# Patient Record
Sex: FEMALE | Race: WHITE | NOT HISPANIC OR LATINO
[De-identification: names, ages, dates, MRNs, and addresses within clinical notes are randomized per-mention and may not be internally consistent; named-entity substitution may affect disease eponyms.]

---

## 2017-09-18 ENCOUNTER — TRANSCRIPTION ENCOUNTER (OUTPATIENT)
Age: 57
End: 2017-09-18

## 2018-08-26 ENCOUNTER — TRANSCRIPTION ENCOUNTER (OUTPATIENT)
Age: 58
End: 2018-08-26

## 2021-07-17 PROBLEM — Z00.00 ENCOUNTER FOR PREVENTIVE HEALTH EXAMINATION: Status: ACTIVE | Noted: 2021-07-17

## 2021-07-22 ENCOUNTER — APPOINTMENT (OUTPATIENT)
Dept: OBGYN | Facility: CLINIC | Age: 61
End: 2021-07-22
Payer: COMMERCIAL

## 2021-07-22 VITALS
HEIGHT: 68.4 IN | SYSTOLIC BLOOD PRESSURE: 126 MMHG | RESPIRATION RATE: 16 BRPM | BODY MASS INDEX: 29.21 KG/M2 | HEART RATE: 80 BPM | DIASTOLIC BLOOD PRESSURE: 78 MMHG | WEIGHT: 195 LBS

## 2021-07-22 DIAGNOSIS — Z80.41 FAMILY HISTORY OF MALIGNANT NEOPLASM OF OVARY: ICD-10-CM

## 2021-07-22 PROCEDURE — 99213 OFFICE O/P EST LOW 20 MIN: CPT | Mod: 25

## 2021-07-22 PROCEDURE — 99386 PREV VISIT NEW AGE 40-64: CPT

## 2021-07-22 RX ORDER — VALACYCLOVIR 1 G/1
1 TABLET, FILM COATED ORAL
Qty: 30 | Refills: 0 | Status: ACTIVE | COMMUNITY
Start: 2021-07-22 | End: 1900-01-01

## 2021-07-27 ENCOUNTER — NON-APPOINTMENT (OUTPATIENT)
Age: 61
End: 2021-07-27

## 2021-07-27 LAB — HPV HIGH+LOW RISK DNA PNL CVX: NOT DETECTED

## 2021-07-28 LAB — CYTOLOGY CVX/VAG DOC THIN PREP: NORMAL

## 2021-08-03 NOTE — HISTORY OF PRESENT ILLNESS
[Patient reported mammogram was normal] : Patient reported mammogram was normal [Patient declined breast sonogram] : Patient declined breast sonogram [postmenopausal] : postmenopausal [N] : Patient does not use contraception [Y] : Positive pregnancy history [Yes] : yes [Menarche Age: ____] : age at menarche was [unfilled] [Menopause Age: ____] : age at menopause was [unfilled] [Currently Active] : currently active [Men] : men [No] : No [TextBox_4] : VAGINAL DRYNESS AND DISCOMFORT [Mammogramdate] : July 2020 [TextBox_19] : As per patient [TextBox_25] : As per patient never done [PapSmeardate] : July 2020 [BoneDensityDate] : 3 yrs ago [TextBox_37] : As per patient [ColonoscopyDate] : 5 yrs ago  [TextBox_43] : As per patient  [PGHxTotal] : 4 [PGxPremature] : 1 [PGHxAbortions] : 1 [FreeTextEntry1] : 2 miscarriage [TextBox_18] : currently on estradiol .01%cream  daily and estradiol patch .05% Bi weekly.

## 2021-08-03 NOTE — REVIEW OF SYSTEMS
[Negative] : Heme/Lymph [Patient Intake Form Reviewed] : Patient intake form was reviewed [FreeTextEntry8] : SEE HPI

## 2021-08-03 NOTE — PHYSICAL EXAM
[Alert] : alert [Appropriately responsive] : appropriately responsive [No Acute Distress] : no acute distress [No Lymphadenopathy] : no lymphadenopathy [Regular Rate Rhythm] : regular rate rhythm [Clear to Auscultation B/L] : clear to auscultation bilaterally [Soft] : soft [Non-tender] : non-tender [Non-distended] : non-distended [No Mass] : no mass [Oriented x3] : oriented x3 [Examination Of The Breasts] : a normal appearance [No Masses] : no breast masses were palpable [Labia Majora] : normal [Labia Minora] : normal [Atrophy] : atrophy [Normal] : normal [Uterine Adnexae] : normal [No Murmurs] : no murmurs [No HSM] : No HSM [No Lesions] : no lesions [FreeTextEntry8] : R/V CONF, NO GROSS MASSES NOTED

## 2021-08-03 NOTE — DISCUSSION/SUMMARY
[FreeTextEntry1] : ANNUAL PREVENTITIVE CARE GYN EXAM\par ATROPHY\par MENOPAUSAL SYNDROME\par REFILL OF ESTROGEN VAGINAL CREAM\par REFILL OF ESTRADIOL PATCH\par H/O GENITAL HERPES\par REFILL OF VALCYCLOVIR\par S/E D/W PT\par MLT DW\par VAGINAL PAP\par SBE\par MAMMO\par DEXA RX WITH PCP\par COLONOSCOPY

## 2021-09-30 ENCOUNTER — APPOINTMENT (OUTPATIENT)
Dept: OBGYN | Facility: CLINIC | Age: 61
End: 2021-09-30
Payer: SELF-PAY

## 2021-09-30 DIAGNOSIS — N95.1 MENOPAUSAL AND FEMALE CLIMACTERIC STATES: ICD-10-CM

## 2021-09-30 PROCEDURE — 58999 UNLISTED PX FML GENITAL SYS: CPT | Mod: GC

## 2021-11-11 ENCOUNTER — APPOINTMENT (OUTPATIENT)
Dept: OBGYN | Facility: CLINIC | Age: 61
End: 2021-11-11
Payer: SELF-PAY

## 2021-11-11 PROCEDURE — 58999 UNLISTED PX FML GENITAL SYS: CPT

## 2021-11-11 RX ORDER — ESTRADIOL 0.1 MG/G
0.1 CREAM VAGINAL
Qty: 1 | Refills: 0 | Status: COMPLETED | COMMUNITY
Start: 2021-07-22 | End: 2021-11-11

## 2021-12-20 ENCOUNTER — APPOINTMENT (OUTPATIENT)
Dept: OBGYN | Facility: CLINIC | Age: 61
End: 2021-12-20
Payer: SELF-PAY

## 2021-12-20 PROCEDURE — ZZZZZ: CPT

## 2022-05-16 ENCOUNTER — APPOINTMENT (OUTPATIENT)
Dept: OBGYN | Facility: CLINIC | Age: 62
End: 2022-05-16
Payer: COMMERCIAL

## 2022-05-16 DIAGNOSIS — B37.3 CANDIDIASIS OF VULVA AND VAGINA: ICD-10-CM

## 2022-05-16 DIAGNOSIS — A60.00 HERPESVIRAL INFECTION OF UROGENITAL SYSTEM, UNSPECIFIED: ICD-10-CM

## 2022-05-16 PROCEDURE — 99213 OFFICE O/P EST LOW 20 MIN: CPT

## 2022-05-16 RX ORDER — VALACYCLOVIR 500 MG/1
500 TABLET, FILM COATED ORAL TWICE DAILY
Qty: 30 | Refills: 1 | Status: ACTIVE | COMMUNITY
Start: 2022-05-16 | End: 1900-01-01

## 2022-05-16 RX ORDER — FLUCONAZOLE 150 MG/1
150 TABLET ORAL
Qty: 3 | Refills: 0 | Status: ACTIVE | COMMUNITY
Start: 2022-05-16 | End: 1900-01-01

## 2022-05-16 NOTE — DISCUSSION/SUMMARY
[FreeTextEntry1] : Vaginal infection \par presumptive monilia\par vag cx \par Recurrent outbreak of genital herpes\par rx valtrex\par pt counselled that MLT can not be performed today\par pt told to reschedule in 4 weeks if she does not have any vaginal symptoms

## 2022-05-16 NOTE — HISTORY OF PRESENT ILLNESS
[FreeTextEntry1] : pt felt well s/p MLT treatment up to 4 months after 3rd treatment\par for past 2 months felt that she has a yeast infection \par also patient with kown genital herpes and felt irritation like an outbreak at same spot x 2 days\par pt received BLT cream today and felt discomfort in the area

## 2022-05-16 NOTE — REASON FOR VISIT
[Follow-Up] : a follow-up evaluation of [FreeTextEntry2] : pt presents for annual MLT but has noticed for a few weeks feeling of a yeast infection and 2 days irritation at vaginal opening

## 2022-05-16 NOTE — PHYSICAL EXAM
[Discharge] : a  ~M vaginal discharge was present [Moderate] : moderate [White] : white [Thick] : thick [Normal] : normal

## 2022-06-16 ENCOUNTER — APPOINTMENT (OUTPATIENT)
Dept: OBGYN | Facility: CLINIC | Age: 62
End: 2022-06-16
Payer: COMMERCIAL

## 2022-06-16 VITALS
HEIGHT: 68 IN | HEART RATE: 73 BPM | SYSTOLIC BLOOD PRESSURE: 130 MMHG | BODY MASS INDEX: 29.55 KG/M2 | WEIGHT: 195 LBS | DIASTOLIC BLOOD PRESSURE: 83 MMHG

## 2022-06-16 DIAGNOSIS — N89.8 OTHER SPECIFIED NONINFLAMMATORY DISORDERS OF VAGINA: ICD-10-CM

## 2022-06-16 PROCEDURE — 99213 OFFICE O/P EST LOW 20 MIN: CPT

## 2022-06-18 PROBLEM — N89.8 VAGINAL IRRITATION: Status: ACTIVE | Noted: 2022-06-18

## 2022-06-18 NOTE — DISCUSSION/SUMMARY
[FreeTextEntry1] : vulvovaginal atrophy\par vulvar irritation\par pt counselled not to proceed with MLT today\par and reschedule in 3-4 weeks\par

## 2022-06-18 NOTE — HISTORY OF PRESENT ILLNESS
[FreeTextEntry1] : pt with known h/o genital herpes\par last visit she had an active outbreak\par  now resolved\par pt still does not feel 100% externally \par internally feels ok

## 2022-06-18 NOTE — PHYSICAL EXAM
[Chaperone Present] : A chaperone was present in the examining room during all aspects of the physical examination [Appropriately responsive] : appropriately responsive [Alert] : alert [No Acute Distress] : no acute distress [Soft] : soft [Non-tender] : non-tender [Non-distended] : non-distended [No Mass] : no mass [Oriented x3] : oriented x3 [Vulvar Atrophy] : vulvar atrophy [Labia Majora] : normal [Labia Minora] : normal [Atrophy] : atrophy [Normal] : normal [Uterine Adnexae] : normal

## 2022-06-30 ENCOUNTER — TRANSCRIPTION ENCOUNTER (OUTPATIENT)
Age: 62
End: 2022-06-30

## 2022-07-12 ENCOUNTER — APPOINTMENT (OUTPATIENT)
Dept: OBGYN | Facility: CLINIC | Age: 62
End: 2022-07-12

## 2022-07-12 DIAGNOSIS — Z12.39 ENCOUNTER FOR OTHER SCREENING FOR MALIGNANT NEOPLASM OF BREAST: ICD-10-CM

## 2022-07-14 ENCOUNTER — APPOINTMENT (OUTPATIENT)
Dept: OBGYN | Facility: CLINIC | Age: 62
End: 2022-07-14

## 2022-07-14 DIAGNOSIS — N95.2 POSTMENOPAUSAL ATROPHIC VAGINITIS: ICD-10-CM

## 2022-07-14 PROCEDURE — 58999 UNLISTED PX FML GENITAL SYS: CPT

## 2022-07-17 PROBLEM — N95.2 VAGINAL ATROPHY: Status: ACTIVE | Noted: 2022-07-17

## 2022-07-17 PROBLEM — N95.2 VAGINAL ATROPHY: Status: RESOLVED | Noted: 2022-07-12 | Resolved: 2022-07-17

## 2022-07-17 NOTE — PLAN
[FreeTextEntry1] : Vaginal atrophy\par pt doing well with MLT treatment\par presents for annual maintenance\par MLT performed\par pt also has vaginal atrophy symptoms at the introitus- rx given on 7/12/22- for estrace cream to apply qhs x 2 weeks and then BIW

## 2022-07-25 ENCOUNTER — APPOINTMENT (OUTPATIENT)
Dept: OBGYN | Facility: CLINIC | Age: 62
End: 2022-07-25

## 2022-08-23 ENCOUNTER — APPOINTMENT (OUTPATIENT)
Dept: OBGYN | Facility: CLINIC | Age: 62
End: 2022-08-23

## 2022-08-23 VITALS
OXYGEN SATURATION: 98 % | WEIGHT: 210 LBS | SYSTOLIC BLOOD PRESSURE: 145 MMHG | HEIGHT: 68 IN | HEART RATE: 75 BPM | BODY MASS INDEX: 31.83 KG/M2 | RESPIRATION RATE: 17 BRPM | DIASTOLIC BLOOD PRESSURE: 86 MMHG

## 2022-08-23 DIAGNOSIS — Z01.419 ENCOUNTER FOR GYNECOLOGICAL EXAMINATION (GENERAL) (ROUTINE) W/OUT ABNORMAL FINDINGS: ICD-10-CM

## 2022-08-23 PROCEDURE — 99396 PREV VISIT EST AGE 40-64: CPT

## 2022-08-23 NOTE — PHYSICAL EXAM
[Chaperone Present] : A chaperone was present in the examining room during all aspects of the physical examination [Appropriately responsive] : appropriately responsive [Alert] : alert [No Acute Distress] : no acute distress [No Lymphadenopathy] : no lymphadenopathy [Regular Rate Rhythm] : regular rate rhythm [No Murmurs] : no murmurs [Clear to Auscultation B/L] : clear to auscultation bilaterally [Soft] : soft [Non-tender] : non-tender [Non-distended] : non-distended [No HSM] : No HSM [No Lesions] : no lesions [No Mass] : no mass [Oriented x3] : oriented x3 [Examination Of The Breasts] : a normal appearance [No Masses] : no breast masses were palpable [Labia Majora] : normal [Labia Minora] : normal [Normal] : normal [Absent] : absent [Uterine Adnexae] : absent [FreeTextEntry6] : pelvic exam unremarkable [FreeTextEntry8] : R/V CONF, NO GROSS MASSES NOTED

## 2022-08-23 NOTE — HISTORY OF PRESENT ILLNESS
[postmenopausal] : postmenopausal [Y] : Positive pregnancy history [No] : Patient does not have concerns regarding sex [FreeTextEntry1] : annual wwe [Mammogramdate] : 2022 [PapSmeardate] : 2021 [ColonoscopyDate] : 2015 [PGHxTotal] : 3 [[facility]] : 1 [[employer] [facility] Cliniciving] : 1 [PGHxABSpont] : 2

## 2022-08-24 LAB — HPV HIGH+LOW RISK DNA PNL CVX: NOT DETECTED

## 2022-08-29 LAB — CYTOLOGY CVX/VAG DOC THIN PREP: NORMAL

## 2022-12-15 RX ORDER — ESTRADIOL 0.1 MG/G
0.1 CREAM VAGINAL
Qty: 1 | Refills: 1 | Status: ACTIVE | COMMUNITY
Start: 2022-07-12 | End: 1900-01-01

## 2022-12-22 ENCOUNTER — NON-APPOINTMENT (OUTPATIENT)
Age: 62
End: 2022-12-22

## 2023-03-16 ENCOUNTER — OFFICE (OUTPATIENT)
Dept: URBAN - METROPOLITAN AREA CLINIC 100 | Facility: CLINIC | Age: 63
Setting detail: OPHTHALMOLOGY
End: 2023-03-16
Payer: COMMERCIAL

## 2023-03-16 DIAGNOSIS — H02.834: ICD-10-CM

## 2023-03-16 DIAGNOSIS — H16.223: ICD-10-CM

## 2023-03-16 DIAGNOSIS — H52.7: ICD-10-CM

## 2023-03-16 DIAGNOSIS — H25.13: ICD-10-CM

## 2023-03-16 DIAGNOSIS — H40.013: ICD-10-CM

## 2023-03-16 DIAGNOSIS — H02.831: ICD-10-CM

## 2023-03-16 PROCEDURE — 92015 DETERMINE REFRACTIVE STATE: CPT | Performed by: OPHTHALMOLOGY

## 2023-03-16 PROCEDURE — 92014 COMPRE OPH EXAM EST PT 1/>: CPT | Performed by: OPHTHALMOLOGY

## 2023-03-16 PROCEDURE — 92133 CPTRZD OPH DX IMG PST SGM ON: CPT | Performed by: OPHTHALMOLOGY

## 2023-03-16 ASSESSMENT — REFRACTION_MANIFEST
OS_ADD: +2.50
OS_VA1: 20/20-1
OD_VA1: 20/20
OS_SPHERE: +0.75
OS_CYLINDER: -0.75
OS_VA1: 20/20-1
OD_AXIS: 110
OD_CYLINDER: -1.25
OS_CYLINDER: -0.75
OD_SPHERE: +1.50
OD_VA1: 20/20
OD_ADD: +2.50
OD_AXIS: 110
OS_AXIS: 070
OD_SPHERE: +1.50
OS_SPHERE: +0.75
OD_CYLINDER: -1.25
OS_AXIS: 070

## 2023-03-16 ASSESSMENT — AXIALLENGTH_DERIVED
OS_AL: 23.5378
OS_AL: 23.5378
OD_AL: 23.4357
OS_AL: 23.5378

## 2023-03-16 ASSESSMENT — REFRACTION_CURRENTRX
OS_ADD: +2.25
OD_ADD: +2.25
OS_CYLINDER: -0.50
OS_SPHERE: +1.50
OD_AXIS: 108
OS_CYLINDER: -0.50
OD_VPRISM_DIRECTION: PROGS
OD_VPRISM_DIRECTION: SV
OD_CYLINDER: -1.00
OD_OVR_VA: 20/
OD_CYLINDER: -0.75
OS_SPHERE: PLANO
OD_AXIS: 115
OS_VPRISM_DIRECTION: SV
OS_VPRISM_DIRECTION: PROGS
OS_AXIS: 080
OS_AXIS: 099
OD_SPHERE: +1.00
OS_OVR_VA: 20/
OD_SPHERE: +2.50
OD_OVR_VA: 20/
OS_OVR_VA: 20/

## 2023-03-16 ASSESSMENT — PACHYMETRY
OD_CT_CORRECTION: -1
OD_CT_UM: 555
OS_CT_UM: 539
OS_CT_CORRECTION: 1

## 2023-03-16 ASSESSMENT — REFRACTION_AUTOREFRACTION
OS_CYLINDER: -0.75
OD_CYLINDER: -1.25
OS_SPHERE: +0.75
OD_AXIS: 109
OS_AXIS: 071
OD_SPHERE: +1.50

## 2023-03-16 ASSESSMENT — CONFRONTATIONAL VISUAL FIELD TEST (CVF)
OS_FINDINGS: FULL
OD_FINDINGS: FULL

## 2023-03-16 ASSESSMENT — VISUAL ACUITY
OS_BCVA: 20/20-2
OD_BCVA: 20/20-1

## 2023-03-16 ASSESSMENT — SPHEQUIV_DERIVED
OD_SPHEQUIV: 0.875
OS_SPHEQUIV: 0.375

## 2023-03-16 ASSESSMENT — KERATOMETRY
OS_K2POWER_DIOPTERS: 43.75
OD_AXISANGLE_DEGREES: 075
OD_K1POWER_DIOPTERS: 42.75
OD_K2POWER_DIOPTERS: 43.25
OS_K1POWER_DIOPTERS: 42.75
OS_AXISANGLE_DEGREES: 095

## 2023-03-16 ASSESSMENT — TONOMETRY
OS_IOP_MMHG: 17
OD_IOP_MMHG: 17

## 2023-06-02 RX ORDER — ESTRADIOL 0.05 MG/D
0.05 PATCH, EXTENDED RELEASE TRANSDERMAL
Qty: 8 | Refills: 3 | Status: ACTIVE | COMMUNITY
Start: 2021-07-22 | End: 1900-01-01

## 2023-06-05 DIAGNOSIS — R92.2 INCONCLUSIVE MAMMOGRAM: ICD-10-CM

## 2023-06-27 ENCOUNTER — APPOINTMENT (OUTPATIENT)
Dept: OBGYN | Facility: CLINIC | Age: 63
End: 2023-06-27
Payer: SELF-PAY

## 2023-06-27 DIAGNOSIS — N95.2 POSTMENOPAUSAL ATROPHIC VAGINITIS: ICD-10-CM

## 2023-06-27 PROCEDURE — 58999 UNLISTED PX FML GENITAL SYS: CPT

## 2023-07-27 ENCOUNTER — OFFICE (OUTPATIENT)
Dept: URBAN - METROPOLITAN AREA CLINIC 100 | Facility: CLINIC | Age: 63
Setting detail: OPHTHALMOLOGY
End: 2023-07-27

## 2023-07-27 ASSESSMENT — REFRACTION_AUTOREFRACTION
OD_SPHERE: +1.50
OD_CYLINDER: -1.25
OS_CYLINDER: -0.75
OS_AXIS: 071
OD_AXIS: 109
OS_SPHERE: +0.75

## 2023-07-27 ASSESSMENT — AXIALLENGTH_DERIVED
OS_AL: 23.5378
OD_AL: 23.4357

## 2023-07-27 ASSESSMENT — SPHEQUIV_DERIVED
OS_SPHEQUIV: 0.375
OD_SPHEQUIV: 0.875

## 2023-07-27 ASSESSMENT — KERATOMETRY
OD_K1POWER_DIOPTERS: 42.75
OD_AXISANGLE_DEGREES: 075
OS_K1POWER_DIOPTERS: 42.75
OS_K2POWER_DIOPTERS: 43.75
OS_AXISANGLE_DEGREES: 095
OD_K2POWER_DIOPTERS: 43.25

## 2023-07-27 ASSESSMENT — VISUAL ACUITY
OD_BCVA: 20/20-1
OS_BCVA: 20/20-2

## 2023-07-27 ASSESSMENT — CONFRONTATIONAL VISUAL FIELD TEST (CVF)
OD_FINDINGS: FULL
OS_FINDINGS: FULL

## 2023-08-29 ENCOUNTER — APPOINTMENT (OUTPATIENT)
Dept: OBGYN | Facility: CLINIC | Age: 63
End: 2023-08-29

## 2023-12-11 ENCOUNTER — OFFICE (OUTPATIENT)
Dept: URBAN - METROPOLITAN AREA CLINIC 100 | Facility: CLINIC | Age: 63
Setting detail: OPHTHALMOLOGY
End: 2023-12-11
Payer: COMMERCIAL

## 2023-12-11 ASSESSMENT — REFRACTION_AUTOREFRACTION
OD_AXIS: 109
OD_CYLINDER: -1.25
OS_AXIS: 071
OS_CYLINDER: -0.75
OD_SPHERE: +1.50
OS_SPHERE: +0.75

## 2023-12-11 ASSESSMENT — SPHEQUIV_DERIVED
OS_SPHEQUIV: 0.375
OD_SPHEQUIV: 0.875

## 2024-04-08 ENCOUNTER — OFFICE (OUTPATIENT)
Dept: URBAN - METROPOLITAN AREA CLINIC 100 | Facility: CLINIC | Age: 64
Setting detail: OPHTHALMOLOGY
End: 2024-04-08
Payer: COMMERCIAL

## 2024-04-08 DIAGNOSIS — H52.4: ICD-10-CM

## 2024-04-08 DIAGNOSIS — H40.013: ICD-10-CM

## 2024-04-08 PROBLEM — H43.813 POSTERIOR VITREOUS DETACHMENT; BOTH EYES: Status: ACTIVE | Noted: 2024-04-08

## 2024-04-08 PROCEDURE — 92133 CPTRZD OPH DX IMG PST SGM ON: CPT | Performed by: OPHTHALMOLOGY

## 2024-04-08 PROCEDURE — 92014 COMPRE OPH EXAM EST PT 1/>: CPT | Performed by: OPHTHALMOLOGY

## 2024-04-08 PROCEDURE — 92015 DETERMINE REFRACTIVE STATE: CPT | Performed by: OPHTHALMOLOGY
